# Patient Record
Sex: FEMALE | Race: BLACK OR AFRICAN AMERICAN | Employment: FULL TIME | URBAN - METROPOLITAN AREA
[De-identification: names, ages, dates, MRNs, and addresses within clinical notes are randomized per-mention and may not be internally consistent; named-entity substitution may affect disease eponyms.]

---

## 2021-12-02 ENCOUNTER — VIRTUAL VISIT (OUTPATIENT)
Dept: DIABETES SERVICES | Age: 36
End: 2021-12-02
Payer: COMMERCIAL

## 2021-12-02 DIAGNOSIS — O24.414 INSULIN CONTROLLED GESTATIONAL DIABETES MELLITUS (GDM) IN SECOND TRIMESTER: Primary | ICD-10-CM

## 2021-12-02 PROCEDURE — G0108 DIAB MANAGE TRN  PER INDIV: HCPCS

## 2021-12-02 NOTE — PROGRESS NOTES
[facility] for Diabetes Health  Diabetes Self-Management Education & Support Program  Pre-program Assessment and Encounter Note   (see encounter note/yellow box below pre-program assessment info)    Reason for Referral: GDM  Referral Source: Amber Yadav MD  Services requested: Pregnancy DSMES    ASSESSMENT    From my perspective, the participant would benefit from [facility] specifically related to Reducing risks, Healthy eating, Physical activity and Problem solving. During the program, we will focus on providing DSMES that specifically addresses participant's interest in Reducing risks and Healthy eating, as shown by their reported readiness to change. The participant would be best served by attending weekly individual sessions. GDM - individual visits only. Diabetes Self-Management Education Follow-up Visit: 12/13/2021 with me       Clinical Presentation  Suad Hickman is a 30 y.o. Multi-racial female referred for diabetes self-management education. Participant has Type 3 DM for <1 year. Family history positive for diabetes. Patient reports not receiving DSMES services in the past. Most recent A1c value: No results found for: HBA1C, [id number], [id number] - n/a    Diabetes-related medical history:  N/a    Diabetes-related medications:  Current dosing:   Key Antihyperglycemic Medications     Patient is on no antihyperglycemic meds. Blood Pressure Management  Key ACE/ARB Medications     Patient is on no ACE or ARB meds. Lipid Management  Key Antihyperlipidemia Meds     The patient is on no antihyperlipidemia meds. Clot Prevention  Key Anti-Platelet Anticoagulant Meds     The patient is on no antiplatelet meds or anticoagulants.           Learning Assessment  Learning objectives Educator assessment (12/2/2021)   Diabetes Disease Process  The participant can   A) describe diabetes in basic terms;   B) state the type of diabetes they have; &   C) state accepted blood glucose targets. Healthy Eating  The participant can   A) identify carbohydrate foods; &   B) accurately read food labels. Being Active  The participant can  A) state the benefits of physical activity;  B) report their current PA practices;  C) identify PA they would consider incorporating in their lives; &  D) develop an implementation plan. Monitoring  The participant can  A) operate their blood glucose meter; &  B) describe how they log their blood glucoses to share with their provider. Taking Medications  The participant can  A) name their diabetes medications;  B) state the purpose and dose;  C) note side effects; &  D) describe proper storage, disposal & transport (if appropriate). Healthy Coping  The participant can    A) describe their response to diabetes diagnosis; B) describe their specific coping mechanisms;  C) identify supportive people and/or other resources that positively support their diabetes self-care and health. Reducing Risks  The participant can describe the preventive measures used by providers to promote health and prevent diabetes complications. Problem Solving  The participant can   A) identify signs, symptoms & treatment of hypoglycemia;   B) identify signs, symptoms & treatment of hyperglycemia;  C) describe their sick day plan; &  D) identify BG patterns to discuss with their provider.        No  Yes  Yes        Yes  Yes        Yes  Yes  Yes  No        Yes  Yes          n/a                 Yes  Yes  Yes        Yes          No  No  No  Yes     Characteristics to Learning   Barriers to Learning   [] Cognitive loss  [] Mental retardation   [] Intellectual delay/cognitive impairment  [] Psychiatric disorder  [] Visually impaired  [] Hearing loss                 [] Low literacy (difficulty with written text)  [] Low numeracy (difficulty with mathematical information  [] Low health literacy (difficulty with understanding health information & services  [] Language  [] Functional limitation  [] Pain   [] Financial  [] Transportation  [x] None   Favorite Ways to Learn   [x] Lecture  [] Slides  [x] Reading [] Video-Internet  [] Cassettes/CDs/MP3's  [] Interactive Small Groups [x] Other       Behavioral Assessment  Current self-care practices  Educator assessment (12/2/2021)   Healthy Eating  Current practices  24-hour Dietary Recall:  Breakfast: scrambled eggs, santiago, water    Lunch: salad with salmon, or soup, or leftovers    Dinner: meat/protein, starchy vegetable, non-starchy vegetables    Snacks: fruit and/or yogurt or cheese  Beverages: water, seltzer water, diet soda    Alcohol: none     Would benefit from DSMES related to Healthy Eating: Yes      Eats a carbohydrate controlled diet: Yes      Stage of change: Action   Being Active  Current practices  How many days during the past week have you performed physical activity where your heart beats faster and your breathing is harder than normal for 30 minutes or more? 3 days    How many days in a typical week do you perform activity such as this?  2 days     Would benefit from [facility] related to Being Active: Yes      Exercises 150 minutes/week: No      Stage of change: Action     Monitoring  Current practices  Do you monitor your blood sugar? Yes    How often do you monitor? 4x/day    What are the range of readings? <95-<140 mg/dL  Breakfast: <95 mg/dL  Lunch: <140 mg/dL  Dinner: <140 mg/dL  Bedtime: <140 mg/dL    Do you know your last A1c measurement? n/a    Do you know the meaning of the A1c? n/a     Would benefit from [facility] related to Monitoring: No      Uses BG readings to establish trends and understand BG patterns: Yes      Stage of change: Maintenance   Taking Medication  Current practices  Do you understand what your diabetes medications do? n/a    How often do you miss doses of your diabetes medications?  Not taking diabetes medication    Can you afford your diabetes medications? n/a   Would benefit from [facility] related to Taking Medication: No, will touch on insulin in anticipation of requiring it later in pregnancy      Takes medications consistently to receive full benefit: n/a      Stage of change: n/a       Healthy Coping   Current state  Diabetes Skills, Confidence and Preparedness Index:     Would benefit from [facility] related to Healthy Coping: Yes      Identifies specific people, organizations,etc, that actively support their diabetes self-care efforts: Yes      Stage of change: Action     Reducing Risks  Current state  Vaccines:  Influenza: There is no immunization history on file for this patient. Pneumococcal:   There is no immunization history on file for this patient. Hepatitis:   There is no immunization history on file for this patient. Examinations:  No Diabetic HM Topics for this patient     Dental exam: Last appointment was: <6 months    Foot exam: n/a    Heart Protection:  BP Readings from Last 2 Encounters:   No data found for BP        No results found for: LDL, LDLC, DLDLP     Kidney Protection:  No results found for: MCACR, MCA1, MCA2, MCA3, MCAU, MCAU2, MCALPOCT     Would benefit from [facility] related to Reducing Risks: Yes      Actively participates in decision-making with provider regarding secondary prevention:  Yes      Stage of change: Action   Problem Solving  Current state  Hypoglycemia Management:  What are signs and symptoms of hypoglycemia that you experience? Pt reported being unaware of s/s of hypoglycemia    How do you prevent hypoglycemia? Pt reported being unaware of how to prevent hypoglycemia    How do you treat hypoglycemia? Pt reported being unaware of how to treat hypoglycemia    Hyperglycemia Management:  What are signs and symptoms of hyperglycemia that you experience? Pt reported being unaware of s/s of hyperglycemia    How can you prevent hyperglycemia?  Pt reported being unaware of how to prevent hyperglycemia    Sick Day Management:  What do you do differently on sick days? Pt reported being unaware of self-management on sick days    Pattern Management:  Do you notice blood glucose patterns when you look at the readings in your meter or logbook? Yes    How do you use the blood glucose readings from your meter or logbook? Understand how body responds to meals     Would benefit from [facility] related to Problem Solving: Yes      Articulates appropriate strategies to address hypoglycemia, hyperglycemia, sick day care and BG pattern: No      Stage of change: Preparation     Note: Content derived from the American Association of Diabetes Educators' Diabetes Education Curriculum: A Guide to Successful Self-Management (3rd edition)        [employer] [facility] Diabetes [facility]  Diabetes Self-Management Education & Support Program  Encounter note    SUMMARY  Diabetes self-care management training was completed related to Reducing risks and Healthy eating. The participant will return on December 13 to continue DSMES related to Healthy eating, Physical activity, Healthy coping and Problem solving. The participant did not identify SMART Goal(s): - n/a, and will practice knowledge and skills related to reducing risks, healthy eating and monitoring, healthy coping and problem solving and being active to improve diabetes self-management. EVALUATION:  Ms. Briana Sharma is already familiar with carb counting and reading nutrition labels, and she is eating a carb controlled diet, however she was keen to review the sources of carbohydrates and how to build well balanced meals. She expressed understanding the ADA recommendations on spacing meals 4-5 hours apart, including breakfast daily, choosing nutrient-dense carbs and healthier fats most often, sources and roles of proteins, fats, carbs, Health Plate, carb counting, and how to read/use nutrition labels.      Ms. Briana Sharma was less familiar with the physiology of GDM and the risks, however she expressed understanding of the related information and feels confident she will be able to keep her BGs in target range and is accepting of the possibility that management could mean taking insulin in the future. RECOMMENDATIONS:  None at this time, Ms. Sanjana Junior is doing a great job managing her GDM. Next provider visit is scheduled for 12/13/2021. SMART GOAL(S)  1.    ACHIEVEMENT OF GOAL(S)  [] 0-24%     [] 25-49%     [] 50-74%     [] %  2. ACHIEVEMENT OF GOAL(S)  [] 0-24%     [] 25-49%     [] 50-74%     [] %  3. ACHIEVEMENT OF GOAL(S)  [] 0-24%     [] 25-49%     [] 50-74%     [] %       DATE DSMES TOPIC EVALUATION     12/2/2021 WHAT IS DIABETES?   a. Role of the normal pancreas in energy balance and blood glucose control   b. The defect seen in diabetes   c. Signs & symptoms of diabetes   d. Diagnosis of diabetes   e. Types of diabetes   f. Blood glucose targets in non-pregnant & non-pregnant adults       The participant knows   Their type of diabetes Yes   The basic physiologic defect Yes   Blood glucose targets Yes     DATE DSMES TOPIC EVALUATION     12/2/2021 HOW DO I STAY HEALTHY?   a. Prevention    Vaccinations    Preconception care (if applicable)  b. Examinations    Eye     Dental   Foot   c. Diabetic complications' prevention    Heart health    Kidney Health    [facility] health    Sleep health      The participant has a personal diabetes care record to keep abreast of diabetes health Yes    The participant would benefit from n/a at this time, she is doing a great job. DATE [employer] TOPIC EVALUATION     12/2/2021 WHAT CAN I EAT?   a. General principles   b. Determining a healthy weight   c. Nutritional terms & tools    Healthy Plate method    Carbohydrate Counting    Reading food labels    Free apps   d.  Pregnancy recommendations      The participant    Uses Healthy Plate principles in constructing meals Yes   Reads food labels in choosing acceptable foods Yes    The participant would benefit from continuing to keep her carb portions well controlled. Luis Reyes RN on 12/2/2021 at 12:17 PM    I have personally reviewed the health record, including provider notes, laboratory data and current medications before making these care and education recommendations. The time spent in this effort is included in the total time. Total minutes: 61    [id number] [facility] Emergency Adaptations for Telehealth:  Elvira Moscoso was evaluated through a synchronous (real-time) audio-video encounter. The patient (or guardian if applicable) is aware that this is a billable service. Verbal consent to proceed has been obtained within the past 12 months. The visit was conducted pursuant to the emergency declaration under the [address] [place] [address] authority and the [employer] and [employer] General Act. Patient identification was verified, and a caregiver was present when  appropriate. The patient was located in a state where the provider was credentialed to provide care.

## 2021-12-13 ENCOUNTER — VIRTUAL VISIT (OUTPATIENT)
Dept: DIABETES SERVICES | Age: 36
End: 2021-12-13
Payer: COMMERCIAL

## 2021-12-13 DIAGNOSIS — O24.414 INSULIN CONTROLLED GESTATIONAL DIABETES MELLITUS (GDM) IN SECOND TRIMESTER: Primary | ICD-10-CM

## 2021-12-13 PROCEDURE — G0108 DIAB MANAGE TRN  PER INDIV: HCPCS

## 2021-12-13 NOTE — PROGRESS NOTES
[facility] for Diabetes [facility]  Diabetes Self-Management Education & Support Program  Encounter note    SUMMARY  Diabetes self-care management training was completed related to Healthy eating and Monitoring. The participant will return on TBD to continue DSMES related to Physical activity, Healthy coping and Problem solving. The participant did identify SMART Goal(s): - Count  the carbs at each meal and logging meal/carbs/2 hr postprandial BG for review with OB, PCP this week, and will practice knowledge and skills related to reducing risks, healthy eating and monitoring, healthy coping and problem solving and being active to improve diabetes self-management. EVALUATION:  Ms. Lacy Sibley shared she is experiencing more nausea and vomiting this week and has noticed an increase in a few postprandial BGs. For example, 2 hours after a breakfast of eggs, santiago, two slices of toast, and a small glass of juice, her BG was 153. She is very concerned about this increase and has decreased her carb intake at meals in response. Today, we reviewed the sources of carbs, the benefits of whole fruit over fruit juice, carb counting, logging meals/carb counts/postprandial BGs to review with providers, maintaining a decreased carb count of 15g at Breakfast, 30g at lunch, and 30g at dinner, the possibility of starting medications to help with BG - insulin and possibly metformin, how to test for and treat hypoglycemia, using apps and online resources to gather nutrition facts and make informed decisions about appropriate menu options at restaurants, pre-prepping and planning for lunch solutions, and pairing carbs with proteins at meals for balanced meals and to help control BGs. Ms. Lacy Sibley expressed understanding of each of the points and states she will keep a food log with carb counts and her 2 hour postprandial BGs to share with her providers at appointments this week.   She is feeling frustrated with herself because of her BGs, I let her know that she is doing a great job and doing everything that has been asked of her, and I reiterated that as the pregnancy continues it may become harder for the her body to overcome the insulin resistance worsened by pregnancy hormones, which may necessitate using insulin or possibly metformin. She is ready to discuss this with her providers and implement if needed. Ms. Jorge Hancock will contact the [facility] to schedule her next appointment as needed. RECOMMENDATIONS:  Focus on SMART Goal     Next provider visit is scheduled for TBD       SMART GOAL(S)  1. Count  the carbs at each meal and logging meal/carbs/2 hr postprandial BG for review with OB, PCP this week  ACHIEVEMENT OF GOAL(S)  [] 0-24%     [] 25-49%     [] 50-74%     [] %  2. ACHIEVEMENT OF GOAL(S)  [] 0-24%     [] 25-49%     [] 50-74%     [] %  3. ACHIEVEMENT OF GOAL(S)  [] 0-24%     [] 25-49%     [] 50-74%     [] %     DATE DSMES TOPIC EVALUATION     12/13/2021 WHAT CAN I EAT?   a. General principles   b. Determining a healthy weight   c. Nutritional terms & tools    Healthy Plate method    Carbohydrate Counting    Reading food labels    Free apps   d. Pregnancy recommendations      The participant    Uses Healthy Plate principles in constructing meals Yes   Reads food labels in choosing acceptable foods Yes    The participant would benefit from counting the carbs at each meal and logging meal/carbs/2 hr postprandial BG for review with OB, PCP, and me at next appointment. DATE DSMES TOPIC EVALUATION     12/13/2021 HOW CAN BLOOD GLUCOSE MONITORING HELP ME?   a. Value of blood glucose monitoring   b. Realistic expectations   c. Blood glucose monitoring targets   d. Target adjustments   e. Setting a1c & blood glucose targets with provider   f. Meter selection    g. Technique for obtaining blood droplet   h. Blood glucose testing sites   i.  Determining best times to test   j. Pregnancy recommendations   k. Data sharing with provider        The participant    Can demonstrate their glucometer procedure Yes   Logs their BG readings Yes    The participant would benefit from The participant would benefit from counting the carbs at each meal and logging meal/carbs/2 hr postprandial BG for review with OB, PCP, and me at next appointment. .       Addis Kitchen RN on 12/13/2021 at 9:49 AM    I have personally reviewed the health record, including provider notes, laboratory data and current medications before making these care and education recommendations. The time spent in this effort is included in the total time. Total minutes: 72    [id number] [facility] Emergency Adaptations for Telehealth:  Sarah Mantilla was evaluated through a synchronous (real-time) audio-video encounter. The patient (or guardian if applicable) is aware that this is a billable service. Verbal consent to proceed has been obtained within the past 12 months. The visit was conducted pursuant to the emergency declaration under the [address] [place] [address] authority and the [employer] and [employer] General Act. Patient identification was verified, and a caregiver was present when  appropriate. The patient was located in a state where the provider was credentialed to provide care.

## 2021-12-13 NOTE — LETTER
12/13/2021 9:48 AM    Patient:  Sherri Mcgill   YOB: 1985  Date of Visit: 12/13/2021      Dear Olga Matias MD  [address] [facility] [address]  Via Fax: [phone]     Rupinder Josue MD  [address]  [address] [address]  Via Fax: [phone]: Thank you for referring Ms. Sherri Mcgill to me for evaluation/treatment. Details of today's visit will be in my encounter note. If you have questions, please do not hesitate to call me. I look forward to following Ms. Cano along with you.         Sincerely,      Oneil Quiles RN

## 2023-05-20 ENCOUNTER — HOSPITAL ENCOUNTER (OUTPATIENT)
Facility: HOSPITAL | Age: 38
Discharge: HOME | End: 2023-05-20
Payer: COMMERCIAL

## 2023-05-20 DIAGNOSIS — O26.832: ICD-10-CM

## 2023-05-20 PROCEDURE — 76770 US EXAM ABDO BACK WALL COMP: CPT
